# Patient Record
(demographics unavailable — no encounter records)

---

## 2025-02-03 NOTE — PHYSICAL EXAM
[No Acute Distress] : no acute distress [Normal Sclera/Conjunctiva] : normal sclera/conjunctiva [Normal Outer Ear/Nose] : the ears and nose were normal in appearance [Clear to Auscultation] : lungs were clear to auscultation bilaterally [Normal Rate] : heart rate was normal [No Edema] : no peripheral edema [Soft] : abdomen soft [Spine Straight] : spine straight [No Stigmata of Cushings Syndrome] : no stigmata of Cushings Syndrome [Normal Gait] : normal gait [Normal Strength/Tone] : muscle strength and tone were normal [No Rash] : no rash [Normal Reflexes] : deep tendon reflexes were 2+ and symmetric [No Tremors] : no tremors [Oriented x3] : oriented to person, place, and time

## 2025-02-03 NOTE — REASON FOR VISIT
[Thyroid nodule/ MNG] : thyroid nodule/ MNG [Weight Management/Obesity] : weight management/obesity [Other___] : [unfilled] [Follow - Up] : a follow-up visit

## 2025-07-28 NOTE — REASON FOR VISIT
[Follow - Up] : a follow-up visit [Thyroid nodule/ MNG] : thyroid nodule/ MNG [Weight Management/Obesity] : weight management/obesity [Other___] : [unfilled]

## 2025-07-29 NOTE — DATA REVIEWED
[FreeTextEntry1] : Reviewed Labs:  - Labs (06/25/2025) s. creatinine 0.67, ALT 16, LDL-c 86, A1c 4.8%, TSH 1.81  - 01/23/25 vit D 58, TSH 2.61 (From pt's phone) - 03/14/24 s. creat 0.68, eGFR 92, calcium 9.5,  - 02/14/24 LDL-c 221, triglycerides 168, A1c 4.9%, C-peptide 8.8,   Reviewed imaging: - 06/21/22 US FNA thyroid, 1.6 x 0.6 x 0.6 cm Impression: Left thyroid nodule fine needle aspiration, as described.  - 12/24/20,05/20/22 Thyroid US

## 2025-07-29 NOTE — END OF VISIT
[FreeTextEntry3] :  All medical record entries made by the Scribe were at my, Dr. Arnoldo Major, direction and personally dictated by me on 07/28/2025. I have reviewed the chart and agree that the record accurately reflects my personal performance of the history, physical exam, and assessment and plan. I have also personally directed, reviewed, and agreed with the chart.  [Time Spent: ___ minutes] : I have spent [unfilled] minutes of time on the encounter which excludes teaching and separately reported services.

## 2025-07-29 NOTE — ADDENDUM
[FreeTextEntry1] :  I, Arely Rdz, act solely as a scribe for Dr. Arnoldo Major on this date. 07/28/2025

## 2025-07-29 NOTE — PHYSICAL EXAM
[No Acute Distress] : no acute distress [Normal Sclera/Conjunctiva] : normal sclera/conjunctiva [Normal Outer Ear/Nose] : the ears and nose were normal in appearance [Clear to Auscultation] : lungs were clear to auscultation bilaterally [Normal Rate] : heart rate was normal [No Edema] : no peripheral edema [Soft] : abdomen soft [Spine Straight] : spine straight [No Stigmata of Cushings Syndrome] : no stigmata of Cushings Syndrome [Normal Gait] : normal gait [Normal Strength/Tone] : muscle strength and tone were normal [No Rash] : no rash [Normal Reflexes] : deep tendon reflexes were 2+ and symmetric [No Tremors] : no tremors [Oriented x3] : oriented to person, place, and time [Kyphosis] : no kyphosis present [de-identified] : nodule palpated in left lobe

## 2025-07-29 NOTE — PHYSICAL EXAM
[No Acute Distress] : no acute distress [Normal Sclera/Conjunctiva] : normal sclera/conjunctiva [Normal Outer Ear/Nose] : the ears and nose were normal in appearance [Clear to Auscultation] : lungs were clear to auscultation bilaterally [Normal Rate] : heart rate was normal [No Edema] : no peripheral edema [Soft] : abdomen soft [Spine Straight] : spine straight [No Stigmata of Cushings Syndrome] : no stigmata of Cushings Syndrome [Normal Gait] : normal gait [Normal Strength/Tone] : muscle strength and tone were normal [No Rash] : no rash [Normal Reflexes] : deep tendon reflexes were 2+ and symmetric [No Tremors] : no tremors [Oriented x3] : oriented to person, place, and time [Kyphosis] : no kyphosis present [de-identified] : nodule palpated in left lobe

## 2025-07-29 NOTE — HISTORY OF PRESENT ILLNESS
[FreeTextEntry1] : 73 year old F pt, with Hx of thyroid nodules (dx. in ~2020), referred by Terri Joseph, presents today to establish endocrine care.  Other PMHx: fatty liver disease (08/2023 was last checkup, dx earlier), HLD, subclinical hypothyroidism No PMHx of: Heart conditions, bone fractures PSHx: 04/2023 MACK implant removed 01/2024 FHx: None No FHx of: DM, thyroid conditions SHx: No smoking, no EtOH NKDA  infected COVID 2022 Pt lives alone, born in [facility], lived in  for the past 30 years. She reports she was unaffected by [place].  03/26/2024  Pt with hx of severe sleep apnea disorder, presents today to establish endocrine care for elevated TSH, thyroid nodules, and weight management.  Evie ID [id number]  CC: "I was dx with fatty liver disease likely caused by my inability to lose weight. I was wondering if I could be prescribed Wegovy." Pt states that she was told her liver enzymes are not improving. She reports that she has a liver ultrasound scheduled for 04/2024 and will follow up with a hepatologist. 02/16/24 TSH 7.4, free T4 1.3, Total T3 131(), TPOAb negative.  As per pt, her thyroid nodules were initially found ~2020. A biopsy was done soon afterward that was unremarkable. Her last endocrinologist visit was 2 years ago and she is seeking a second opinion because she was constantly told that everything was normal.  Pt states that she has not been physically active lately but was more active 5-6 years ago. She endorses a lack of energy and shortness of breath when exercising, which hinders her from exercising as often as she'd like. She has not visited an RD, but tries to avoid high sugar and sodium foods. Pt endorses (constant) discomfort behind her neck, difficulty breathing, and difficulty swallowing, but denies voice changes.  Review In Labs:  03/14/24 s. creat 0.68, eGFR 92, calcium 9.5,  02/14/24 LDL-c 221, triglycerides 168, A1c 4.9%, C-peptide 8.8,  06/21/22 US FNA thyroid, 1.6 x 0.6 x 0.6 cm Impression: Left thyroid nodule fine needle aspiration, as described.  12/24/20,05/20/22 Thyroid US  02/03/2025  Pt has /91 and BMI 24.7. No significant weight change.  CC: "I feel alright, and I lost 40 lbs." Pt reports that she feels occasionally depressed since 01/2025 and has been following-up regularly with a therapist. She was taking low-dose Mounjaro 2.5 mg qw for 3-4 months and has lost 40 lbs, from 06/2024 to 10/2024.  Pt states that she previously ate often throughout the day, but now consumes 3 meals regularly.  Pt denies difficulty breathing, difficulty swallowing, and voice changes. - 01/23/25 vit D 58, TSH 2.61 (From pt's phone)  07/28/2025 Pt has /74 and BMI 26.2.  She gained 9 lbs since 02/2025.  CC: "I am feeling perfect, and I am doing well".  # overweight pencil: Pt's BMI was in the obese range in 06/2024. Mounjaro was administered from 06/2024-10/2024.  - Labs (06/25/2025) s. creatinine 0.67, ALT 16, LDL-c 86, A1c 4.8%, TSH 1.81, T4 free 1.1. Pt denies difficulty breathing, difficulty swallowing, and voice changes. She endorses regular physical activity and currently, she is following a balanced diet. Regarding her bone health, this is being managed by her PCP.   [Medications verified as per pt on 07/28/2025] Current Medications: Carvedilol 12.5 mg bid, Rosuvastatin 20 mg, Quetiapine 25 mg, Eszopiclone 3 mg Held: Mounjaro (administered from 06/2024 - 10/2024, achieved 40 lb weight loss) Medication modified/added this visit:  Mounjaro 2.5 mg qw

## 2025-07-29 NOTE — HISTORY OF PRESENT ILLNESS
[FreeTextEntry1] : 73 year old F pt, with Hx of thyroid nodules (dx. in ~2020), referred by Terri Joseph, presents today to establish endocrine care.  Other PMHx: fatty liver disease (08/2023 was last checkup, dx earlier), HLD, subclinical hypothyroidism No PMHx of: Heart conditions, bone fractures PSHx: 04/2023 MACK implant removed 01/2024 FHx: None No FHx of: DM, thyroid conditions SHx: No smoking, no EtOH NKDA  infected COVID 2022 Pt lives alone, born in [facility], lived in  for the past 30 years. She reports she was unaffected by [place].  03/26/2024  Pt with hx of severe sleep apnea disorder, presents today to establish endocrine care for elevated TSH, thyroid nodules, and weight management.  Evie ID [id number]  CC: "I was dx with fatty liver disease likely caused by my inability to lose weight. I was wondering if I could be prescribed Wegovy." Pt states that she was told her liver enzymes are not improving. She reports that she has a liver ultrasound scheduled for 04/2024 and will follow up with a hepatologist. 02/16/24 TSH 7.4, free T4 1.3, Total T3 131(), TPOAb negative.  As per pt, her thyroid nodules were initially found ~2020. A biopsy was done soon afterward that was unremarkable. Her last endocrinologist visit was 2 years ago and she is seeking a second opinion because she was constantly told that everything was normal.  Pt states that she has not been physically active lately but was more active 5-6 years ago. She endorses a lack of energy and shortness of breath when exercising, which hinders her from exercising as often as she'd like. She has not visited an RD, but tries to avoid high sugar and sodium foods. Pt endorses (constant) discomfort behind her neck, difficulty breathing, and difficulty swallowing, but denies voice changes.  Review In Labs:  03/14/24 s. creat 0.68, eGFR 92, calcium 9.5,  02/14/24 LDL-c 221, triglycerides 168, A1c 4.9%, C-peptide 8.8,  06/21/22 US FNA thyroid, 1.6 x 0.6 x 0.6 cm Impression: Left thyroid nodule fine needle aspiration, as described.  12/24/20,05/20/22 Thyroid US  02/03/2025  Pt has /91 and BMI 24.7. No significant weight change.  CC: "I feel alright, and I lost 40 lbs." Pt reports that she feels occasionally depressed since 01/2025 and has been following-up regularly with a therapist. She was taking low-dose Mounjaro 2.5 mg qw for 3-4 months and has lost 40 lbs, from 06/2024 to 10/2024.  Pt states that she previously ate often throughout the day, but now consumes 3 meals regularly.  Pt denies difficulty breathing, difficulty swallowing, and voice changes. - 01/23/25 vit D 58, TSH 2.61 (From pt's phone)  07/28/2025 Pt has /74 and BMI 26.2.  She gained 9 lbs since 02/2025.  CC: "I am feeling perfect, and I am doing well".  # overweight pencil: Pt's BMI was in the obese range in 06/2024. Mounjaro was administered from 06/2024-10/2024.  - Labs (06/25/2025) s. creatinine 0.67, ALT 16, LDL-c 86, A1c 4.8%, TSH 1.81, T4 free 1.1. Pt denies difficulty breathing, difficulty swallowing, and voice changes. She endorses regular physical activity and currently, she is following a balanced diet. Regarding her bone health, this is being managed by her PCP.   [Medications verified as per pt on 07/28/2025] Current Medications: Carvedilol 12.5 mg bid, Rosuvastatin 20 mg, Quetiapine 25 mg, Eszopiclone 3 mg Held: Mounjaro (administered from 06/2024 - 10/2024, achieved 40 lb weight loss) Medication modified/added this visit:  Mounjaro 2.5 mg qw

## 2025-07-29 NOTE — REVIEW OF SYSTEMS
[As Noted in HPI] : as noted in HPI [Recent Weight Gain (___ Lbs)] : recent weight gain: [unfilled] lbs [Negative] : Heme/Lymph [Dysphagia] : no dysphagia [Dysphonia] : no dysphonia [Shortness Of Breath] : no shortness of breath